# Patient Record
(demographics unavailable — no encounter records)

---

## 2025-05-24 NOTE — HISTORY OF PRESENT ILLNESS
[FreeTextEntry1] : ROLO JC returns to the office today. He is a 74 year-old man who underwent a radical prostatectomy at [facility] and then developed biochemical recurrence although his rate of PSA change has been very slow. It was last checked in April 2023 and was 0.42ng/mL.   He notes no new symptoms such as any urinary complaints of hematuria or dysuria.  No problems of urinary incontinence at this time.  No interval infections or episodes of retention.  He also has no new abdominal or flank pain.  He feels well overall, good appetite and normal bowel function.  He was started on Furosemide and is experiencing urinary frequency.   He has a history of kidney stones and did require intervention in the past.

## 2025-05-24 NOTE — HISTORY OF PRESENT ILLNESS
[FreeTextEntry1] : ROLO JC returns to the office today. He is a 74 year-old man who underwent a radical prostatectomy at [facility] and then developed biochemical recurrence although his rate of PSA change has been very slow. It was last checked in April 2023 and was 0.42ng/mL.   He notes no new symptoms such as any urinary complaints of hematuria or dysuria.  No problems of urinary incontinence at this time.  No interval infections or episodes of retention.  He also has no new abdominal or flank pain.  He feels well overall, good appetite and normal bowel function.  He was started on Furosemide and is experiencing urinary frequency.   He has a history of kidney stones and did require intervention in the past.

## 2025-05-24 NOTE — HISTORY OF PRESENT ILLNESS
[FreeTextEntry1] : ROLO JC returns to the office today. He is a 74 year-old man who underwent a radical prostatectomy at [facility] and then developed biochemical recurrence although his rate of PSA change has been very slow. It was last checked in April 2023 and was 0.42ng/mL.   He notes no new symptoms such as any urinary complaints of hematuria or dysuria.  No problems of urinary incontinence at this time.  No interval infections or episodes of retention.  He also has no new abdominal or flank pain.  He feels well overall, good appetite and normal bowel function.  He was started on Furosemide and is experiencing urinary frequency.   He has a history of kidney stones and did require intervention in the past.

## 2025-05-24 NOTE — LETTER GREETING
[Dear  ___] : Dear  [unfilled], [Follow-Up] : Your patient, [unfilled] was seen in my office today for follow-up [Please see my note below.] : Please see my note below. [FreeTextEntry2] : [place] MD [address] [place] [address]

## 2025-05-24 NOTE — LETTER CLOSING
[FreeTextEntry3] : Sincerely,      Jay Jay Watson MD, FACS Director of Urology Services, [facility] Chief of Urology, [facility]  of Urology   [facility] for [facility], [place] [id number] P: [phone] F: [phone] [facility]

## 2025-05-24 NOTE — LETTER GREETING
[Dear  ___] : Dear  [unfilled], [Follow-Up] : Your patient, [unfilled] was seen in my office today for follow-up [Please see my note below.] : Please see my note below. [FreeTextEntry2] : [place] MD [address] [place] [address]

## 2025-05-24 NOTE — LETTER CLOSING
[FreeTextEntry3] : Sincerely,      Jay Jay Watson MD, FACS Director of Urology Services, [facility] Chief of Urology, [facility]  of Urology   [facility] for Urology, [place] [id number] P: [phone] F: [phone] [facility]

## 2025-05-24 NOTE — LETTER CLOSING
[FreeTextEntry3] : Sincerely,      Jay Jay Watson MD, FACS Director of Urology Services, [facility] Chief of Urology, [facility]  of Urology   [facility] for [facility], [place] [id number] P: [phone] F: [phone] [facility]

## 2025-05-24 NOTE — LETTER GREETING
[Dear  ___] : Dear  [unfilled], [Follow-Up] : Your patient, [unfilled] was seen in my office today for follow-up [Please see my note below.] : Please see my note below. [FreeTextEntry2] : [place] MD [address] [place] [address]